# Patient Record
Sex: FEMALE | URBAN - METROPOLITAN AREA
[De-identification: names, ages, dates, MRNs, and addresses within clinical notes are randomized per-mention and may not be internally consistent; named-entity substitution may affect disease eponyms.]

---

## 2022-12-13 ENCOUNTER — APPOINTMENT (RX ONLY)
Dept: URBAN - METROPOLITAN AREA CLINIC 77 | Facility: CLINIC | Age: 52
Setting detail: DERMATOLOGY
End: 2022-12-13

## 2022-12-13 DIAGNOSIS — L85.3 XEROSIS CUTIS: ICD-10-CM

## 2022-12-13 DIAGNOSIS — D22 MELANOCYTIC NEVI: ICD-10-CM

## 2022-12-13 DIAGNOSIS — L81.4 OTHER MELANIN HYPERPIGMENTATION: ICD-10-CM

## 2022-12-13 DIAGNOSIS — Z71.89 OTHER SPECIFIED COUNSELING: ICD-10-CM

## 2022-12-13 DIAGNOSIS — D18.0 HEMANGIOMA: ICD-10-CM

## 2022-12-13 DIAGNOSIS — L91.8 OTHER HYPERTROPHIC DISORDERS OF THE SKIN: ICD-10-CM

## 2022-12-13 DIAGNOSIS — L82.0 INFLAMED SEBORRHEIC KERATOSIS: ICD-10-CM

## 2022-12-13 DIAGNOSIS — L57.0 ACTINIC KERATOSIS: ICD-10-CM

## 2022-12-13 PROBLEM — D18.01 HEMANGIOMA OF SKIN AND SUBCUTANEOUS TISSUE: Status: ACTIVE | Noted: 2022-12-13

## 2022-12-13 PROBLEM — D22.5 MELANOCYTIC NEVI OF TRUNK: Status: ACTIVE | Noted: 2022-12-13

## 2022-12-13 PROCEDURE — 99203 OFFICE O/P NEW LOW 30 MIN: CPT | Mod: 25

## 2022-12-13 PROCEDURE — ? COUNSELING

## 2022-12-13 PROCEDURE — ? LIQUID NITROGEN

## 2022-12-13 PROCEDURE — 17110 DESTRUCTION B9 LES UP TO 14: CPT

## 2022-12-13 PROCEDURE — ? PRESCRIPTION

## 2022-12-13 PROCEDURE — ? TREATMENT REGIMEN

## 2022-12-13 PROCEDURE — ? PHOTODYNAMIC THERAPY COUNSELING

## 2022-12-13 RX ORDER — PHARMACY COMPOUNDING ACCESSORY
EACH MISCELLANEOUS
Qty: 50 | Refills: 3 | Status: ERX | COMMUNITY
Start: 2022-12-13

## 2022-12-13 RX ADMIN — Medication: at 00:00

## 2022-12-13 ASSESSMENT — LOCATION DETAILED DESCRIPTION DERM
LOCATION DETAILED: GLABELLA
LOCATION DETAILED: LEFT SUPERIOR UPPER BACK
LOCATION DETAILED: LEFT LATERAL MALAR CHEEK
LOCATION DETAILED: LEFT INFERIOR CENTRAL MALAR CHEEK
LOCATION DETAILED: LEFT CENTRAL BUCCAL CHEEK
LOCATION DETAILED: LEFT DISTAL PRETIBIAL REGION
LOCATION DETAILED: RIGHT INFERIOR UPPER BACK
LOCATION DETAILED: LEFT LATERAL BUCCAL CHEEK
LOCATION DETAILED: RIGHT INFERIOR ANTERIOR NECK
LOCATION DETAILED: LEFT THENAR EMINENCE
LOCATION DETAILED: PERIUMBILICAL SKIN
LOCATION DETAILED: RIGHT SUPERIOR UPPER BACK
LOCATION DETAILED: LEFT INFERIOR LATERAL MIDBACK
LOCATION DETAILED: LEFT INFERIOR ANTERIOR NECK
LOCATION DETAILED: LEFT ANTERIOR SHOULDER
LOCATION DETAILED: RIGHT LATERAL SUPERIOR CHEST
LOCATION DETAILED: EPIGASTRIC SKIN
LOCATION DETAILED: MIDDLE STERNUM

## 2022-12-13 ASSESSMENT — LOCATION SIMPLE DESCRIPTION DERM
LOCATION SIMPLE: GLABELLA
LOCATION SIMPLE: ABDOMEN
LOCATION SIMPLE: LEFT CHEEK
LOCATION SIMPLE: CHEST
LOCATION SIMPLE: RIGHT ANTERIOR NECK
LOCATION SIMPLE: RIGHT UPPER BACK
LOCATION SIMPLE: LEFT SHOULDER
LOCATION SIMPLE: LEFT HAND
LOCATION SIMPLE: LEFT ANTERIOR NECK
LOCATION SIMPLE: LEFT UPPER BACK
LOCATION SIMPLE: LEFT PRETIBIAL REGION
LOCATION SIMPLE: LEFT LOWER BACK

## 2022-12-13 ASSESSMENT — LOCATION ZONE DERM
LOCATION ZONE: NECK
LOCATION ZONE: FACE
LOCATION ZONE: TRUNK
LOCATION ZONE: HAND
LOCATION ZONE: ARM
LOCATION ZONE: LEG

## 2022-12-13 NOTE — PROCEDURE: TREATMENT REGIMEN
Detail Level: Zone
Plan: Location: face \\n\\nAdvised pt to get PDT done in the Fall due to too numerous to count non hyperkeratotic erythematous lesions on area.\\n\\n** Pt has had LN2 and/or has had chemotherapy (5FU) performed in the past and is getting repeated treatments. **
Plan: Location: face\\nPrescribed: HQ 4% Tretinoin 0.05% compound cream QHS\\n\\nPt has hyperpigmentation on face today.\\nDiscussed with pt that this pigmentation is due to sun damage to the skin, advised patient to use SPF daily.\\nDiscussed with pt that we will start patient on HQ 4% Tretinoin 0.05% to use nightly.\\nDiscussed with pt to apply a pea size amount to face, pushing into pores. \\nDiscussed with pt to avoid areas around eyes, nasal crease, and around the lips since skin is thin and may get irritated easily.\\n\\nDiscussed with pt that it will come in a box that says keep refrigerated.\\nDiscussed with pt that they do not need to keep it refrigerated, but have to keep it in a dark place since sunlight may oxidize it.\\nDiscussed with pt that if skin becomes irritated while using cream, then pt can change frequency to every other night, or every other other night, etc.\\nDiscussed with pt that results are not immediate and may take up to a month to notice change.\\nAdvised pt to apply a moisturizing cream such as Cerave afterwards to help reestablish a healthy skin barrier.\\nF/u as needed.

## 2022-12-13 NOTE — PROCEDURE: LIQUID NITROGEN
Number Of Freeze-Thaw Cycles: 3 freeze-thaw cycles
Consent: The patient's consent was obtained including but not limited to risks of crusting, scabbing, blistering, scarring, darker or lighter pigmentary change, recurrence, incomplete removal and infection.
Render Post-Care Instructions In Note?: no
Duration Of Freeze Thaw-Cycle (Seconds): 5
Post-Care Instructions: I reviewed with the patient in detail post-care instructions. Patient is to wear sunprotection, and avoid picking at any of the treated lesions. Pt may apply Vaseline to crusted or scabbing areas.
Medical Necessity Information: It is in your best interest to select a reason for this procedure from the list below. All of these items fulfill various CMS LCD requirements except the new and changing color options.
Detail Level: Detailed
Medical Necessity Clause: This procedure was medically necessary because the lesions that were treated were:

## 2023-11-10 ENCOUNTER — APPOINTMENT (RX ONLY)
Dept: URBAN - METROPOLITAN AREA CLINIC 77 | Facility: CLINIC | Age: 53
Setting detail: DERMATOLOGY
End: 2023-11-10

## 2023-11-13 ENCOUNTER — APPOINTMENT (RX ONLY)
Dept: URBAN - METROPOLITAN AREA CLINIC 77 | Facility: CLINIC | Age: 53
Setting detail: DERMATOLOGY
End: 2023-11-13

## 2023-11-13 ENCOUNTER — RX ONLY (OUTPATIENT)
Age: 53
Setting detail: RX ONLY
End: 2023-11-13

## 2023-11-13 DIAGNOSIS — L82.0 INFLAMED SEBORRHEIC KERATOSIS: ICD-10-CM

## 2023-11-13 DIAGNOSIS — L57.0 ACTINIC KERATOSIS: ICD-10-CM

## 2023-11-13 PROBLEM — D48.5 NEOPLASM OF UNCERTAIN BEHAVIOR OF SKIN: Status: ACTIVE | Noted: 2023-11-13

## 2023-11-13 PROBLEM — D23.5 OTHER BENIGN NEOPLASM OF SKIN OF TRUNK: Status: ACTIVE | Noted: 2023-11-13

## 2023-11-13 PROBLEM — D23.71 OTHER BENIGN NEOPLASM OF SKIN OF RIGHT LOWER LIMB, INCLUDING HIP: Status: ACTIVE | Noted: 2023-11-13

## 2023-11-13 PROCEDURE — 99213 OFFICE O/P EST LOW 20 MIN: CPT | Mod: 25

## 2023-11-13 PROCEDURE — ? LIQUID NITROGEN

## 2023-11-13 PROCEDURE — ? PHOTODYNAMIC THERAPY COUNSELING

## 2023-11-13 PROCEDURE — ? TREATMENT REGIMEN

## 2023-11-13 PROCEDURE — ? BIOPSY BY SHAVE METHOD

## 2023-11-13 PROCEDURE — ? COUNSELING

## 2023-11-13 PROCEDURE — 17000 DESTRUCT PREMALG LESION: CPT | Mod: 59

## 2023-11-13 PROCEDURE — 11102 TANGNTL BX SKIN SINGLE LES: CPT | Mod: 59

## 2023-11-13 PROCEDURE — 17110 DESTRUCTION B9 LES UP TO 14: CPT | Mod: NC

## 2023-11-13 RX ORDER — PHARMACY COMPOUNDING ACCESSORY
EACH MISCELLANEOUS
Qty: 50 | Refills: 3 | Status: ERX | COMMUNITY
Start: 2023-11-13

## 2023-11-13 ASSESSMENT — LOCATION DETAILED DESCRIPTION DERM
LOCATION DETAILED: LEFT NASAL SIDEWALL
LOCATION DETAILED: LEFT INFERIOR UPPER BACK
LOCATION DETAILED: LEFT LATERAL BUCCAL CHEEK
LOCATION DETAILED: LEFT BUTTOCK
LOCATION DETAILED: RIGHT MEDIAL SUPERIOR CHEST
LOCATION DETAILED: RIGHT MEDIAL BREAST 2-3:00 REGION
LOCATION DETAILED: LEFT PROXIMAL DORSAL FOREARM
LOCATION DETAILED: LEFT MEDIAL SUPERIOR CHEST
LOCATION DETAILED: INFERIOR THORACIC SPINE

## 2023-11-13 ASSESSMENT — LOCATION ZONE DERM
LOCATION ZONE: NOSE
LOCATION ZONE: FACE
LOCATION ZONE: ARM
LOCATION ZONE: TRUNK

## 2023-11-13 ASSESSMENT — LOCATION SIMPLE DESCRIPTION DERM
LOCATION SIMPLE: LEFT CHEEK
LOCATION SIMPLE: LEFT UPPER BACK
LOCATION SIMPLE: LEFT BUTTOCK
LOCATION SIMPLE: CHEST
LOCATION SIMPLE: RIGHT BREAST
LOCATION SIMPLE: LEFT NOSE
LOCATION SIMPLE: UPPER BACK
LOCATION SIMPLE: LEFT FOREARM

## 2023-11-13 NOTE — PROCEDURE: LIQUID NITROGEN
Duration Of Freeze Thaw-Cycle (Seconds): 5
Consent: The patient's consent was obtained including but not limited to risks of crusting, scabbing, blistering, scarring, darker or lighter pigmentary change, recurrence, incomplete removal and infection.
Detail Level: Generalized
Number Of Freeze-Thaw Cycles: 2 freeze-thaw cycles
Show Applicator Variable?: Yes
Render Note In Bullet Format When Appropriate: No
Post-Care Instructions: I reviewed with the patient in detail post-care instructions. Patient is to wear sunprotection, and avoid picking at any of the treated lesions. Pt may apply Vaseline to crusted or scabbing areas.
Medical Necessity Clause: This procedure was medically necessary because the lesions that were treated were:
Medical Necessity Information: It is in your best interest to select a reason for this procedure from the list below. All of these items fulfill various CMS LCD requirements except the new and changing color options.
Spray Paint Text: The liquid nitrogen was applied to the skin utilizing a spray paint frosting technique.
Detail Level: Detailed
Number Of Freeze-Thaw Cycles: 3 freeze-thaw cycles

## 2024-08-30 ENCOUNTER — APPOINTMENT (RX ONLY)
Dept: URBAN - METROPOLITAN AREA CLINIC 77 | Facility: CLINIC | Age: 54
Setting detail: DERMATOLOGY
End: 2024-08-30

## 2024-08-30 DIAGNOSIS — L30.4 ERYTHEMA INTERTRIGO: ICD-10-CM

## 2024-08-30 DIAGNOSIS — L91.8 OTHER HYPERTROPHIC DISORDERS OF THE SKIN: ICD-10-CM

## 2024-08-30 DIAGNOSIS — L57.0 ACTINIC KERATOSIS: ICD-10-CM

## 2024-08-30 DIAGNOSIS — L82.0 INFLAMED SEBORRHEIC KERATOSIS: ICD-10-CM

## 2024-08-30 PROBLEM — D23.5 OTHER BENIGN NEOPLASM OF SKIN OF TRUNK: Status: ACTIVE | Noted: 2024-08-30

## 2024-08-30 PROCEDURE — ? COUNSELING

## 2024-08-30 PROCEDURE — ? TREATMENT REGIMEN

## 2024-08-30 PROCEDURE — 99213 OFFICE O/P EST LOW 20 MIN: CPT | Mod: 25

## 2024-08-30 PROCEDURE — 17000 DESTRUCT PREMALG LESION: CPT | Mod: 59

## 2024-08-30 PROCEDURE — 17110 DESTRUCTION B9 LES UP TO 14: CPT | Mod: NC

## 2024-08-30 PROCEDURE — ? PRESCRIPTION

## 2024-08-30 PROCEDURE — ? LIQUID NITROGEN

## 2024-08-30 PROCEDURE — ? PHOTODYNAMIC THERAPY COUNSELING

## 2024-08-30 PROCEDURE — 17003 DESTRUCT PREMALG LES 2-14: CPT | Mod: 59

## 2024-08-30 RX ORDER — HYDROCORTISONE ACETATE, IODOQUINOL 19; 10 MG/G; MG/G
CREAM TOPICAL
Qty: 30 | Refills: 3 | Status: ERX | COMMUNITY
Start: 2024-08-30

## 2024-08-30 RX ADMIN — HYDROCORTISONE ACETATE, IODOQUINOL: 19; 10 CREAM TOPICAL at 00:00

## 2024-08-30 ASSESSMENT — LOCATION DETAILED DESCRIPTION DERM
LOCATION DETAILED: LEFT INFERIOR LATERAL MALAR CHEEK
LOCATION DETAILED: RIGHT LATERAL UPPER BACK
LOCATION DETAILED: RIGHT MEDIAL BREAST 5-6:00 REGION
LOCATION DETAILED: LEFT MEDIAL MALAR CHEEK
LOCATION DETAILED: RIGHT MEDIAL SUPERIOR CHEST
LOCATION DETAILED: LEFT POSTERIOR SHOULDER
LOCATION DETAILED: RIGHT LATERAL ZYGOMA
LOCATION DETAILED: LEFT INFERIOR UPPER BACK
LOCATION DETAILED: SUBXIPHOID
LOCATION DETAILED: RIGHT MID-UPPER BACK
LOCATION DETAILED: RIGHT RIB CAGE
LOCATION DETAILED: RIGHT CENTRAL TEMPLE
LOCATION DETAILED: LEFT CENTRAL ZYGOMA

## 2024-08-30 ASSESSMENT — LOCATION ZONE DERM
LOCATION ZONE: TRUNK
LOCATION ZONE: FACE
LOCATION ZONE: ARM

## 2024-08-30 ASSESSMENT — LOCATION SIMPLE DESCRIPTION DERM
LOCATION SIMPLE: CHEST
LOCATION SIMPLE: RIGHT BREAST
LOCATION SIMPLE: LEFT UPPER BACK
LOCATION SIMPLE: RIGHT TEMPLE
LOCATION SIMPLE: ABDOMEN
LOCATION SIMPLE: RIGHT UPPER BACK
LOCATION SIMPLE: LEFT CHEEK
LOCATION SIMPLE: LEFT ZYGOMA
LOCATION SIMPLE: RIGHT ZYGOMA
LOCATION SIMPLE: LEFT SHOULDER

## 2024-08-30 NOTE — PROCEDURE: TREATMENT REGIMEN
Plan: Location: Under breast and in between glutes \\nPrescribe: Vytone cream top qd x 30 days\\n\\nDiscussed with pt that under the breast has lots of friction and sweating which could lead to intertrigo/inflammation.\\nDiscussed with pt that this inflammation is easily infected with yeasts like Candida.\\nDiscussed with pt that intertrigo is an inflammatory condition of skin folds, induced or aggravated by heat, moisture, maceration, friction, and lack of air circulation.\\nDiscussed with pt that we are starting her on Vytone cream, a combination anti-inflammatory for the intertrigo and iodoquinol for possible candida.\\nDiscussed with pt to use Vytone cream daily to help relieve inflammation.\\nDiscussed with pt that she could also try OTC Zeasorb AF daily to help with any fungal infection.\\nDiscussed with pt to try to keep this area moisturized with a good barrier cream sucha as Cerave afterward to help reestablish a healthy skin barrier.\\n\\nF/u in 6 weeks
Detail Level: Zone
Plan: Location: face\\n\\nAdvised pt to get PDT done in the Fall due to too numerous to count non hyperkeratotic erythematous lesions on area.\\n\\n** Pt has had LN2 and/or has had chemotherapy (5FU) performed in the past and is getting repeated treatments. **

## 2024-08-30 NOTE — PROCEDURE: LIQUID NITROGEN
Show Aperture Variable?: Yes
Render Post-Care Instructions In Note?: no
Number Of Freeze-Thaw Cycles: 3 freeze-thaw cycles
Spray Paint Text: The liquid nitrogen was applied to the skin utilizing a spray paint frosting technique.
Medical Necessity Clause: This procedure was medically necessary because the lesions that were treated were:
Consent: The patient's consent was obtained including but not limited to risks of crusting, scabbing, blistering, scarring, darker or lighter pigmentary change, recurrence, incomplete removal and infection.
Post-Care Instructions: I reviewed with the patient in detail post-care instructions. Patient is to wear sunprotection, and avoid picking at any of the treated lesions. Pt may apply Vaseline to crusted or scabbing areas.
Medical Necessity Information: It is in your best interest to select a reason for this procedure from the list below. All of these items fulfill various CMS LCD requirements except the new and changing color options.
Detail Level: Detailed
Detail Level: Generalized
Number Of Freeze-Thaw Cycles: 2 freeze-thaw cycles
Duration Of Freeze Thaw-Cycle (Seconds): 5
Duration Of Freeze Thaw-Cycle (Seconds): 0